# Patient Record
Sex: FEMALE | Race: WHITE | Employment: UNEMPLOYED | URBAN - METROPOLITAN AREA
[De-identification: names, ages, dates, MRNs, and addresses within clinical notes are randomized per-mention and may not be internally consistent; named-entity substitution may affect disease eponyms.]

---

## 2017-01-30 ENCOUNTER — TELEPHONE (OUTPATIENT)
Dept: INTERNAL MEDICINE CLINIC | Facility: CLINIC | Age: 57
End: 2017-01-30

## 2017-01-30 NOTE — TELEPHONE ENCOUNTER
Pt called in stating she has a px appt on  with Dr. Ashwin Saba and her lab orders are . Pt is asking if she can have new orders sent to [facility] in [facility] - phone # [phone].   Pt is asking for a call back once labs are sent to [facility]

## 2017-02-01 NOTE — TELEPHONE ENCOUNTER
Left detailed message on patient's voicemail that Elia Ladd will give orders at the time of her visit.

## 2017-02-01 NOTE — TELEPHONE ENCOUNTER
We cannot send him lab orders to [facility].  She may come and  the orders. Staff may generate the orders for her.

## 2017-02-01 NOTE — TELEPHONE ENCOUNTER
On second thought please do not regenerate those orders. We will have to regenerate orders after the evaluation. The last lab orders given to her were given on January 2016 for a specific need.   Those will not be the same tests is that for a physical.  H

## 2017-02-02 ENCOUNTER — OFFICE VISIT (OUTPATIENT)
Dept: INTERNAL MEDICINE CLINIC | Facility: CLINIC | Age: 57
End: 2017-02-02

## 2017-02-02 VITALS
HEART RATE: 75 BPM | BODY MASS INDEX: 25.76 KG/M2 | WEIGHT: 140 LBS | DIASTOLIC BLOOD PRESSURE: 79 MMHG | SYSTOLIC BLOOD PRESSURE: 125 MMHG | RESPIRATION RATE: 16 BRPM | HEIGHT: 62 IN

## 2017-02-02 DIAGNOSIS — E78.2 MIXED HYPERLIPIDEMIA: ICD-10-CM

## 2017-02-02 DIAGNOSIS — Z01.419 PAPANICOLAOU SMEAR, AS PART OF ROUTINE GYNECOLOGICAL EXAMINATION: ICD-10-CM

## 2017-02-02 DIAGNOSIS — M85.80 OSTEOPENIA: ICD-10-CM

## 2017-02-02 DIAGNOSIS — Z12.31 VISIT FOR SCREENING MAMMOGRAM: Primary | ICD-10-CM

## 2017-02-02 DIAGNOSIS — Z00.00 ROUTINE GENERAL MEDICAL EXAMINATION AT A HEALTH CARE FACILITY: ICD-10-CM

## 2017-02-02 PROCEDURE — 99396 PREV VISIT EST AGE 40-64: CPT | Performed by: INTERNAL MEDICINE

## 2017-02-02 NOTE — PATIENT INSTRUCTIONS
Problem List Items Addressed This Visit        Unprioritized    Hyperlipidemia     She has been tried on multiple statins had not tolerated them, she has not been on Crestor thus far.   She did tolerate the Zetia but was discontinued due to cost.  Recheck l mammogram    -  Primary     Relevant Orders     FATOUMATA SCREENING BILAT (CPT=77067)

## 2017-02-02 NOTE — ASSESSMENT & PLAN NOTE
Menopausal atrophic vaginitis with cervical stenosis. Was able to access the cervix this time in spite of the stenosis. Pap smear has been completed.

## 2017-02-02 NOTE — ASSESSMENT & PLAN NOTE
She has been tried on multiple statins had not tolerated them, she has not been on Crestor thus far.   She did tolerate the Zetia but was discontinued due to cost.  Recheck labs ordered

## 2017-02-02 NOTE — PROGRESS NOTES
HPI:    Patient ID: Zayra Mcdaniel is a 55year old female. HPI Comments: Physical      G2,p2,c sec x 2,  lcb-17 yrs back. ecclampsia with first pregnancy. lmp 10 yrs back,no bleeding or spotting.   Pap Smear,3 Years due on 05/16/2017  Mammogram,1 Yr problems include adherence to diet and adherence to exercise. Risk factors for coronary artery disease include a sedentary lifestyle and dyslipidemia. Review of Systems   Constitutional: Negative. HENT: Negative. Eyes: Negative.     Respiratory Effort normal and breath sounds normal. She has no wheezes. She has no rales. She exhibits no mass and no tenderness. Right breast exhibits no inverted nipple, no mass, no nipple discharge, no skin change and no tenderness.  Left breast exhibits no inverted reviewed.              ASSESSMENT/PLAN:   Visit for screening mammogram  (primary encounter diagnosis)  Mixed hyperlipidemia  Osteopenia  Routine general medical examination at a health care facility  Papanicolaou smear, as part of routine gynecological ex[facility] pharmacy. Is exposed to the flu at this point as her  did have symptoms the flu and has been on the Tamiflu. Patient is provided a Tamiflu pack.          Relevant Orders    CBC WITH DIFFERENTIAL WITH PLATELET (Completed)    COMP METABOLIC PANEL (14

## 2017-02-02 NOTE — ASSESSMENT & PLAN NOTE
Normal exam.  Labs as ordered. Breast exam completed–normal evaluation, no palpable abnormalities, axillary lymphadenopathy or nipple discharge. Skin check normal excepting for scattered nevi.   She does get periodic dermatology evaluations at [facility]

## 2017-02-04 LAB — HPV I/H RISK 1 DNA SPEC QL NAA+PROBE: NEGATIVE

## 2017-02-09 LAB
ABSOLUTE BASOPHILS: 25 CELLS/UL (ref 0–200)
ABSOLUTE EOSINOPHILS: 53 CELLS/UL (ref 15–500)
ABSOLUTE LYMPHOCYTES: 1374 CELLS/UL (ref 850–3900)
ABSOLUTE MONOCYTES: 303 CELLS/UL (ref 200–950)
ABSOLUTE NEUTROPHILS: 2345 CELLS/UL (ref 1500–7800)
ALBUMIN/GLOBULIN RATIO: 1.9 (CALC) (ref 1–2.5)
ALBUMIN: 4.7 G/DL (ref 3.6–5.1)
ALKALINE PHOSPHATASE: 51 U/L (ref 33–130)
ALT: 16 U/L (ref 6–29)
APPEARANCE: CLEAR
AST: 18 U/L (ref 10–35)
BASOPHILS: 0.6 %
BILIRUBIN, TOTAL: 0.4 MG/DL (ref 0.2–1.2)
BILIRUBIN: NEGATIVE
BUN: 20 MG/DL (ref 7–25)
CALCIUM: 9.3 MG/DL (ref 8.6–10.4)
CARBON DIOXIDE: 29 MMOL/L (ref 20–31)
CHLORIDE: 102 MMOL/L (ref 98–110)
CHOL/HDLC RATIO: 4.3 (CALC)
CHOLESTEROL, TOTAL: 277 MG/DL (ref 125–200)
COLOR: YELLOW
CREATININE: 0.7 MG/DL (ref 0.5–1.05)
EGFR IF AFRICN AM: 113 ML/MIN/1.73M2
EGFR IF NONAFRICN AM: 98 ML/MIN/1.73M2
EOSINOPHILS: 1.3 %
GLOBULIN: 2.5 G/DL (CALC) (ref 1.9–3.7)
GLUCOSE: 83 MG/DL (ref 65–99)
GLUCOSE: NEGATIVE
HDL CHOLESTEROL: 64 MG/DL
HEMATOCRIT: 41.4 % (ref 35–45)
HEMOGLOBIN A1C: 5.3 % OF TOTAL HGB
HEMOGLOBIN: 13.7 G/DL (ref 11.7–15.5)
KETONES: NEGATIVE
LDL-CHOLESTEROL: 201 MG/DL (CALC)
LEUKOCYTE ESTERASE: NEGATIVE
LYMPHOCYTES: 33.5 %
MCH: 30.2 PG (ref 27–33)
MCHC: 33 G/DL (ref 32–36)
MCV: 91.4 FL (ref 80–100)
MONOCYTES: 7.4 %
MPV: 9.3 FL (ref 7.5–12.5)
NEUTROPHILS: 57.2 %
NITRITE: NEGATIVE
NON-HDL CHOLESTEROL: 213 MG/DL (CALC)
OCCULT BLOOD: NEGATIVE
PH: 6.5 (ref 5–8)
PLATELET COUNT: 252 THOUSAND/UL (ref 140–400)
POTASSIUM: 4.3 MMOL/L (ref 3.5–5.3)
PROTEIN, TOTAL: 7.2 G/DL (ref 6.1–8.1)
PROTEIN: NEGATIVE
RDW: 12.8 % (ref 11–15)
RED BLOOD CELL COUNT: 4.53 MILLION/UL (ref 3.8–5.1)
SODIUM: 139 MMOL/L (ref 135–146)
SPECIFIC GRAVITY: 1 (ref 1–1.03)
TRIGLYCERIDES: 61 MG/DL
TSH: 3.16 MIU/L
VITAMIN B12: 318 PG/ML (ref 200–1100)
VITAMIN D, 25-OH, TOTAL: 24 NG/ML (ref 30–100)
WHITE BLOOD CELL COUNT: 4.1 THOUSAND/UL (ref 3.8–10.8)

## 2017-04-03 ENCOUNTER — TELEPHONE (OUTPATIENT)
Dept: INTERNAL MEDICINE CLINIC | Facility: CLINIC | Age: 57
End: 2017-04-03

## 2017-04-05 NOTE — TELEPHONE ENCOUNTER
Pt was called back. States she was already seen by another internist - Dr. Bia Coffey, this morning. No further actions needed by us at this time.

## 2017-05-15 PROCEDURE — 81001 URINALYSIS AUTO W/SCOPE: CPT | Performed by: INTERNAL MEDICINE

## 2017-05-17 PROBLEM — Z01.419 PAPANICOLAOU SMEAR, AS PART OF ROUTINE GYNECOLOGICAL EXAMINATION: Status: RESOLVED | Noted: 2017-02-02 | Resolved: 2017-05-17

## 2017-09-13 PROBLEM — E55.9 VITAMIN D DEFICIENCY: Status: ACTIVE | Noted: 2017-09-13

## 2017-09-25 PROCEDURE — 84165 PROTEIN E-PHORESIS SERUM: CPT | Performed by: INTERNAL MEDICINE

## 2017-09-25 PROCEDURE — 82746 ASSAY OF FOLIC ACID SERUM: CPT | Performed by: INTERNAL MEDICINE

## 2017-09-25 PROCEDURE — 83883 ASSAY NEPHELOMETRY NOT SPEC: CPT | Performed by: INTERNAL MEDICINE

## 2017-09-25 PROCEDURE — 82607 VITAMIN B-12: CPT | Performed by: INTERNAL MEDICINE

## 2017-09-25 PROCEDURE — 86334 IMMUNOFIX E-PHORESIS SERUM: CPT | Performed by: INTERNAL MEDICINE

## 2018-01-12 PROCEDURE — 87086 URINE CULTURE/COLONY COUNT: CPT | Performed by: INTERNAL MEDICINE

## 2019-02-05 PROCEDURE — 86803 HEPATITIS C AB TEST: CPT | Performed by: INTERNAL MEDICINE

## 2021-03-20 ENCOUNTER — IMMUNIZATION (OUTPATIENT)
Dept: LAB | Age: 61
End: 2021-03-20

## 2021-03-20 DIAGNOSIS — Z23 NEED FOR VACCINATION: Primary | ICD-10-CM

## 2021-03-20 PROCEDURE — 0011A COVID-19 MODERNA VACCINE: CPT | Performed by: NURSE PRACTITIONER

## 2021-03-20 PROCEDURE — 91301 COVID-19 MODERNA VACCINE: CPT | Performed by: NURSE PRACTITIONER

## 2021-04-17 ENCOUNTER — IMMUNIZATION (OUTPATIENT)
Dept: LAB | Age: 61
End: 2021-04-17
Attending: NURSE PRACTITIONER

## 2021-04-17 DIAGNOSIS — Z23 NEED FOR VACCINATION: Primary | ICD-10-CM

## 2021-04-17 PROCEDURE — 91301 COVID-19 MODERNA VACCINE: CPT

## 2021-04-17 PROCEDURE — 0012A COVID-19 MODERNA VACCINE: CPT

## (undated) NOTE — MR AVS SNAPSHOT
[address] [address]  [phone]               Thank you for choosing us for your health care visit with Haja Lechuga MD.  We are glad to serve you and happy to provide you with this summary and has not been able to tolerate any due to abdominal pain and reflux. We will follow-up after the scan is completed. Rectal exam was completed–brown stools guaiac negative no palpable abnormality. Colonoscopy–next due in 2018.   Pelvic exam completed–n Assoc Dx:  Routine general medical examination at a health care facility [Z00.00]           Hemoglobin A1C [E]    Complete by:   Feb 02, 2017 (Approximate)    Assoc Dx:  Routine general medical examination at a health care facility [Z00.00]           Lipid Naga [address]  [id number] [address], [place]    It is the patient's responsibility to check with and follow their insurance company's guidelines for prior authorization for this test.  You may be held resp Eat plenty of low-fat dairy products High fat meats and dairy   Choose whole grain products Foods high in sodium   Water is best for hydration Fast food.    Eat at home when possible     Tips for increasing your physical activity – Adults who are physically